# Patient Record
Sex: MALE | Race: WHITE | URBAN - METROPOLITAN AREA
[De-identification: names, ages, dates, MRNs, and addresses within clinical notes are randomized per-mention and may not be internally consistent; named-entity substitution may affect disease eponyms.]

---

## 2021-09-30 ENCOUNTER — OFFICE VISIT (OUTPATIENT)
Dept: ORTHOPEDIC SURGERY | Age: 66
End: 2021-09-30
Payer: COMMERCIAL

## 2021-09-30 VITALS
OXYGEN SATURATION: 94 % | RESPIRATION RATE: 15 BRPM | BODY MASS INDEX: 30.67 KG/M2 | WEIGHT: 239 LBS | HEIGHT: 74 IN | HEART RATE: 82 BPM

## 2021-09-30 DIAGNOSIS — M17.11 PRIMARY OSTEOARTHRITIS OF RIGHT KNEE: Primary | ICD-10-CM

## 2021-09-30 PROCEDURE — 99203 OFFICE O/P NEW LOW 30 MIN: CPT | Performed by: ORTHOPAEDIC SURGERY

## 2021-09-30 NOTE — PROGRESS NOTES
Patient presents to the office today for right knee pain. Pt states pain today in the knee is a 0/10 will increase going up and down the stairs. Pt states onset of pain was about 3 years ago. Patient states he was seeing another orthopedic surgeon and they discussed surgery on the right knee for meniscus tear. Pt did not want to have surgery at that time. He did receive a gel injection in the right knee with no relief. Pt states pain will increase with going up and down the stairs .

## 2021-10-04 ASSESSMENT — ENCOUNTER SYMPTOMS
EYE REDNESS: 0
VOMITING: 0
COLOR CHANGE: 0
EYE PAIN: 0
CHEST TIGHTNESS: 0
WHEEZING: 0
SHORTNESS OF BREATH: 0

## 2021-10-04 NOTE — PROGRESS NOTES
9/30/2021   Chief Complaint   Patient presents with    Knee Pain     right knee        History of Present Illness: Rocío Bhakta is a 74 y.o. male who presents today for evaluation of his right knee pain and stiffness. He has had treatment in the past for meniscus tear and has been dealing with degenerative joint disease for last few years. His pain is worse with going up and down stairs. Currently today he states his pain is mild and does not affect his quality of life or activity level. He has tried gel injections that did not provide much relief. He has remained active with his exercise program but has had to modify his activities to avoid running and jumping because of aching pain with heavy or repetitive activities. Patient presents to the office today for right knee pain. Pt states pain today in the knee is a 0/10 will increase going up and down the stairs. Pt states onset of pain was about 3 years ago. Patient states he was seeing another orthopedic surgeon and they discussed surgery on the right knee for meniscus tear. Pt did not want to have surgery at that time. He did receive a gel injection in the right knee with no relief. Pt states pain will increase with going up and down the stairs . Medical History  Patient's medications, allergies, past medical, surgical, social and family histories were reviewed and updated as appropriate. No past medical history on file. No past surgical history on file. No family history on file.   Social History     Socioeconomic History    Marital status:      Spouse name: None    Number of children: None    Years of education: None    Highest education level: None   Occupational History    None   Tobacco Use    Smoking status: Never Smoker   Substance and Sexual Activity    Alcohol use: Never    Drug use: Never    Sexual activity: None   Other Topics Concern    None   Social History Narrative    None Social Determinants of Health     Financial Resource Strain:     Difficulty of Paying Living Expenses:    Food Insecurity:     Worried About Running Out of Food in the Last Year:     [address] in the Last Year:    Transportation Needs:     Lack of Transportation (Medical):  Lack of Transportation (Non-Medical):    Physical Activity:     Days of Exercise per Week:     Minutes of Exercise per Session:    Stress:     Feeling of Stress :    Social Connections:     Frequency of Communication with Friends and Family:     Frequency of Social Gatherings with Friends and Family:     Attends Islam Services:     Active Member of Clubs or Organizations:     Attends Club or Organization Meetings:     Marital Status:    Intimate Partner Violence:     Fear of Current or Ex-Partner:     Emotionally Abused:     Physically Abused:     Sexually Abused:      No current outpatient medications on file. No current facility-administered medications for this visit. No Known Allergies      Review of Systems   Constitutional: Negative for chills and fever. HENT: Negative for congestion and sneezing. Eyes: Negative for pain and redness. Respiratory: Negative for chest tightness, shortness of breath and wheezing. Cardiovascular: Negative for chest pain and palpitations. Gastrointestinal: Negative for vomiting. Musculoskeletal: Positive for arthralgias. Skin: Negative for color change and rash. Neurological: Negative for weakness and numbness. Psychiatric/Behavioral: Negative for agitation. The patient is not nervous/anxious. Examination:  General Exam:  Vitals: Pulse 82   Resp 15   Ht 6' 2\" (1.88 m)   Wt 239 lb (108.4 kg)   SpO2 94%   BMI 30.69 kg/m²    Physical Exam  Vitals and nursing note reviewed. Constitutional:       Appearance: Normal appearance. HENT:      Head: Normocephalic and atraumatic.    Eyes: Conjunctiva/sclera: Conjunctivae normal.      Pupils: Pupils are equal, round, and reactive to light. Pulmonary:      Effort: Pulmonary effort is normal.   Musculoskeletal:      Cervical back: Normal range of motion. Right hip: No deformity, tenderness, bony tenderness or crepitus. Normal range of motion. Normal strength. Left hip: Normal.      Left knee: No swelling, deformity, effusion, ecchymosis or lacerations. Normal range of motion. No tenderness. No medial joint line or lateral joint line tenderness. No LCL laxity or MCL laxity. Normal alignment and normal meniscus. Comments: Right Lower Extremity:    There is minimal tenderness to palpation throughout the knee maximally along the medial joint line. There is mild global swelling anteriorly at the knee with no obvious effusion. There is 5 out of 5 strength with knee flexion and extension. Sensation is intact throughout the lower extremity. There is full range of motion at the knee with discomfort at the extremes of motion, especially terminal flexion. No instability with varus or valgus stress testing or anterior/posterior drawer testing. Medial Seng's test produces mild pain but no palpable click. Skin is intact. Normal knee alignment. Pulses intact. Skin:     General: Skin is warm and dry. Neurological:      Mental Status: He is alert and oriented to person, place, and time.    Psychiatric:         Mood and Affect: Mood normal.         Behavior: Behavior normal.            Diagnostic testing:  X-ray images were reviewed by myself and discussed with the patient:  Narrative   4 views of the right knee show evidence of moderate to severe    tricompartmental degenerative joint disease with joint space narrowing    both of the medial lateral compartments as well as the patellofemoral    compartment.  There is evidence of chondrocalcinosis at the medial lateral    meniscus.  Mild spurring at the medial joint line.  No fracture or acute    abnormality.  No joint effusion.  Irregular articular surfaces of the    lateral patellofemoral joint         Office Procedures:  No orders of the defined types were placed in this encounter. Assessment and Plan  1. Right knee primary osteoarthritis. We reviewed the x-ray findings and have explained the patient that his x-rays show significant degenerative changes at the knee with along with chondrocalcinosis. I do not expect him to benefit from any knee arthroscopy procedures given the severity of his arthritic condition on the x-ray. We discussed his minimal symptoms at this time and therefore I recommended continued nonoperative management of of the degenerative joint disease. We discussed the potential for steroid injections as needed for flareups of inflammation related to his degenerative condition. He will follow-up as needed if he would like to consider a steroid injection to the knee. We also discussed the potential for knee replacement in the future if he is having significant activity limitations or worsening of his pain and knee function. He will follow-up as needed. Continue with stretching exercises, activity modification, low impact strengthening, and over-the-counter anti-inflammatory medications. We also discussed maintaining a healthy weight.     Electronically signed by Sofiya Arrington MD on 10/4/2021 at 7:54 AM